# Patient Record
Sex: MALE | Race: WHITE | NOT HISPANIC OR LATINO | Employment: FULL TIME | URBAN - METROPOLITAN AREA
[De-identification: names, ages, dates, MRNs, and addresses within clinical notes are randomized per-mention and may not be internally consistent; named-entity substitution may affect disease eponyms.]

---

## 2020-08-03 ENCOUNTER — HOSPITAL ENCOUNTER (EMERGENCY)
Facility: HOSPITAL | Age: 25
Discharge: HOME | End: 2020-08-03
Attending: EMERGENCY MEDICINE

## 2020-08-03 VITALS
HEART RATE: 89 BPM | HEIGHT: 70 IN | BODY MASS INDEX: 26.05 KG/M2 | WEIGHT: 182 LBS | RESPIRATION RATE: 16 BRPM | SYSTOLIC BLOOD PRESSURE: 122 MMHG | OXYGEN SATURATION: 97 % | DIASTOLIC BLOOD PRESSURE: 70 MMHG

## 2020-08-03 DIAGNOSIS — S93.402A MODERATE LEFT ANKLE SPRAIN, INITIAL ENCOUNTER: Primary | ICD-10-CM

## 2020-08-03 DIAGNOSIS — M25.572 LEFT ANKLE PAIN: ICD-10-CM

## 2020-08-03 DIAGNOSIS — M79.672 LEFT FOOT PAIN: ICD-10-CM

## 2020-08-03 PROCEDURE — 99283 EMERGENCY DEPT VISIT LOW MDM: CPT | Mod: 25

## 2020-08-03 PROCEDURE — 25000003 PHARM REV CODE 250: Performed by: EMERGENCY MEDICINE

## 2020-08-03 RX ORDER — NAPROXEN 500 MG/1
500 TABLET ORAL 2 TIMES DAILY WITH MEALS
Qty: 20 TABLET | Refills: 0 | Status: SHIPPED | OUTPATIENT
Start: 2020-08-03 | End: 2020-08-13

## 2020-08-03 RX ORDER — HYDROCODONE BITARTRATE AND ACETAMINOPHEN 5; 325 MG/1; MG/1
1 TABLET ORAL EVERY 6 HOURS PRN
Qty: 8 TABLET | Refills: 0 | Status: SHIPPED | OUTPATIENT
Start: 2020-08-03 | End: 2020-08-05

## 2020-08-03 RX ORDER — IBUPROFEN 400 MG/1
400 TABLET ORAL
Status: COMPLETED | OUTPATIENT
Start: 2020-08-03 | End: 2020-08-03

## 2020-08-03 RX ADMIN — IBUPROFEN 400 MG: 400 TABLET, FILM COATED ORAL at 12:08

## 2020-08-03 NOTE — ED PROVIDER NOTES
Encounter Date: 8/3/2020       History     Chief Complaint   Patient presents with    Ankle Pain     twisted L ankle last pm     25-year-old male presents to the ER for evaluation of acute left ankle and foot pain patient reports he was walking last night and inverted the ankle he has obvious swelling and pain to the lateral ankle and foot radiating up the lateral leg.  He reports pain is severe 10/10 he is unable to ambulate reports he has not walked on it since last night.  He denies numbness or tingling.  He has taken no medication for his symptoms.  No other injuries.        Review of patient's allergies indicates:  No Known Allergies  History reviewed. No pertinent past medical history.  History reviewed. No pertinent surgical history.  No family history on file.  Social History     Tobacco Use    Smoking status: Current Every Day Smoker     Packs/day: 0.50     Types: Cigarettes   Substance Use Topics    Alcohol use: No     Frequency: Never    Drug use: No     Review of Systems   Constitutional: Positive for activity change.   Musculoskeletal: Positive for arthralgias, gait problem, joint swelling and myalgias. Negative for back pain, neck pain and neck stiffness.   Skin: Negative for color change, pallor, rash and wound.   Neurological: Negative for weakness and numbness.   Psychiatric/Behavioral: Negative for confusion.   All other systems reviewed and are negative.      Physical Exam     Initial Vitals [08/03/20 1220]   BP Pulse Resp Temp SpO2   122/70 89 16 -- 97 %      MAP       --         Physical Exam    Nursing note and vitals reviewed.  Constitutional: He appears well-developed and well-nourished. He is not diaphoretic. No distress.   Cardiovascular: Normal rate.   Pulmonary/Chest: No respiratory distress.   Musculoskeletal: Normal range of motion. Tenderness and edema present.        Left ankle: He exhibits swelling. He exhibits normal range of motion, no ecchymosis, no deformity, no laceration  and normal pulse. Tenderness. Lateral malleolus, AITFL and posterior TFL tenderness found. No medial malleolus, no CF ligament, no head of 5th metatarsal and no proximal fibula tenderness found. Achilles tendon normal.        Left foot: Tenderness, bony tenderness and swelling present. No crepitus, deformity or laceration.        Feet:    Neurological: He is alert and oriented to person, place, and time. He has normal strength. No sensory deficit. GCS score is 15. GCS eye subscore is 4. GCS verbal subscore is 5. GCS motor subscore is 6.   Skin: Skin is warm and dry. No rash noted. No erythema. No pallor.   Psychiatric: He has a normal mood and affect.         ED Course   Procedures  Labs Reviewed - No data to display       Imaging Results          X-Ray Foot Complete Left (Final result)  Result time 08/03/20 13:10:28    Final result by Fredy Kelly MD (08/03/20 13:10:28)                 Narrative:    HISTORY: Left foot pain,  trauma sustained in a fall.    FINDINGS: 3 views of the left foot show no acute fracture, dislocation  or destructive osseous lesion. The joint spaces are preserved. Bony  mineralization is normal, with no soft tissue abnormalities or  radiopaque foreign bodies seen.    IMPRESSION: Negative left foot radiographs.    Electronically Signed by Fredy BARCLAY on 8/3/2020 1:16 PM                             X-Ray Ankle Complete Left (Final result)  Result time 08/03/20 13:00:28    Final result by rFedy Kelly MD (08/03/20 13:00:28)                 Narrative:    HISTORY: Left ankle pain laterally,  post twisting injury.    FINDINGS: 3 views of the left ankle show no acute fracture,  dislocation or destructive osseous lesion. The ankle mortise is  intact, with the joint spaces preserved. Bony mineralization is  normal. There is a sclerotic cortically based focus along the medial  aspect of the distal tibial diaphysis, suggesting healed  fibroxanthoma.    IMPRESSION: Negative for acute  fracture or dislocation.    Electronically Signed by Fredy BARCLAY on 8/3/2020 1:03 PM                            X-Rays:   Independently Interpreted Readings:   Other Readings:  X-ray left foot reveals no fracture dislocation   x-ray left ankle reveals no fracture dislocation    Medical Decision Making:   Clinical Tests:   Radiological Study: Ordered and Reviewed  ED Management:  25-year-old male who had a mechanical fall yesterday inverting his left ankle presents to left ankle pain he has audible ankle and foot full positive.  On physical exam he is swelling to the lateral ankle pain and swelling to the midfoot and pain on palpation of the entire lateral malleolus and distal 6 cm of the distal fibula.  No pain to the proximal fibula.  He has normal strength sensation and range of motion.  X-rays were negative of both the foot and ankle for fracture he has been diagnosed with a moderate ankle sprain his and plan placed in a walking boot and given crutches follow-up with orthopedics if symptoms do not improve over the next 2 weeks.  I Prescribed an opiate medication for no more than three days. Pt instructed to not drive, operate heavy machinery, or perform any tasks that require unaltered state. Instructed to not combine with anything else that makes the patient drowsy including benzodiazepines, barbiturates, and alcohol. Advised that opioids are chemically addictive and should only be taken if needed and that this medication should only be used when over-the-counter medications such as NSAIDs and Tylenol do not adequately control pain.  Pt confirms understanding of all of the above and agrees to comply.  Natalie Ivory M.D.  1:27 PM 8/3/2020                                   Clinical Impression:       ICD-10-CM ICD-9-CM   1. Moderate left ankle sprain, initial encounter  S93.402A 845.00   2. Left foot pain  M79.672 729.5   3. Left ankle pain  M25.572 719.47             ED Disposition Condition    Discharge  Stable        ED Prescriptions     Medication Sig Dispense Start Date End Date Auth. Provider    HYDROcodone-acetaminophen (NORCO) 5-325 mg per tablet Take 1 tablet by mouth every 6 (six) hours as needed for Pain. 8 tablet 8/3/2020 8/5/2020 Natalie Ivory MD    naproxen (NAPROSYN) 500 MG tablet Take 1 tablet (500 mg total) by mouth 2 (two) times daily with meals. for 10 days 20 tablet 8/3/2020 8/13/2020 Natalie Ivory MD        Follow-up Information     Follow up With Specialties Details Why Contact Info Additional Information    Finesse Lacey MD Orthopedic Surgery Schedule an appointment as soon as possible for a visit in 2 weeks If your symptoms do not improve [address]  [address]  [facility]  [facility] [id number]  [phone]       [facility] Go to  If symptoms worsen [address] [address]  [phone] 1st floor                                       Natalie Ivory MD  08/03/20 1112

## 2020-08-03 NOTE — DISCHARGE INSTRUCTIONS
You may take Tylenol and ibuprofen as package directs for pain for pain uncontrolled by these over-the-counter medications you may use the Norco as prescribed.